# Patient Record
Sex: FEMALE | Race: WHITE | Employment: FULL TIME | URBAN - METROPOLITAN AREA
[De-identification: names, ages, dates, MRNs, and addresses within clinical notes are randomized per-mention and may not be internally consistent; named-entity substitution may affect disease eponyms.]

---

## 2019-08-07 ENCOUNTER — HOSPITAL ENCOUNTER (OUTPATIENT)
Dept: MAMMOGRAPHY | Age: 39
Discharge: HOME | End: 2019-08-07
Payer: OTHER GOVERNMENT

## 2019-08-07 DIAGNOSIS — Z12.31 VISIT FOR SCREENING MAMMOGRAM: ICD-10-CM

## 2019-08-07 PROCEDURE — 77063 BREAST TOMOSYNTHESIS BI: CPT

## 2019-08-09 ENCOUNTER — HOSPITAL ENCOUNTER (OUTPATIENT)
Dept: ULTRASOUND IMAGING | Age: 39
Discharge: HOME | End: 2019-08-09
Payer: OTHER GOVERNMENT

## 2019-08-09 DIAGNOSIS — R92.8 ABNORMAL MAMMOGRAM: ICD-10-CM

## 2019-08-09 PROCEDURE — 76642 ULTRASOUND BREAST LIMITED: CPT

## 2019-08-23 ENCOUNTER — HOSPITAL ENCOUNTER (OUTPATIENT)
Dept: MRI IMAGING | Age: 39
Discharge: HOME | End: 2019-08-23
Payer: OTHER GOVERNMENT

## 2019-08-23 DIAGNOSIS — R92.2 DENSE BREAST: ICD-10-CM

## 2019-08-23 PROCEDURE — 77049 MRI BREAST C-+ W/CAD BI: CPT

## 2019-08-23 PROCEDURE — 6360000004 HC RX CONTRAST MEDICATION: Performed by: FAMILY MEDICINE

## 2019-08-23 PROCEDURE — A9579 GAD-BASE MR CONTRAST NOS,1ML: HCPCS | Performed by: FAMILY MEDICINE

## 2019-08-23 RX ADMIN — GADOTERIDOL 18 ML: 279.3 INJECTION, SOLUTION INTRAVENOUS at 09:41

## 2024-08-28 ENCOUNTER — ANESTHESIA EVENT (OUTPATIENT)
Age: 44
End: 2024-08-28
Payer: OTHER GOVERNMENT

## 2024-08-28 ENCOUNTER — HOSPITAL ENCOUNTER (EMERGENCY)
Age: 44
Discharge: HOME | End: 2024-08-28
Attending: EMERGENCY MEDICINE
Payer: OTHER GOVERNMENT

## 2024-08-28 ENCOUNTER — ANESTHESIA (OUTPATIENT)
Age: 44
End: 2024-08-28
Payer: OTHER GOVERNMENT

## 2024-08-28 VITALS
OXYGEN SATURATION: 100 % | HEIGHT: 69 IN | TEMPERATURE: 97.9 F | BODY MASS INDEX: 27.4 KG/M2 | HEART RATE: 71 BPM | DIASTOLIC BLOOD PRESSURE: 75 MMHG | SYSTOLIC BLOOD PRESSURE: 101 MMHG | WEIGHT: 185 LBS | RESPIRATION RATE: 18 BRPM

## 2024-08-28 DIAGNOSIS — T18.128A FOOD IMPACTION OF ESOPHAGUS, INITIAL ENCOUNTER: Primary | ICD-10-CM
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
## 2024-08-28 DIAGNOSIS — K56.699 FOOD BOLUS OBSTRUCTION OF INTESTINE (HCC): ICD-10-CM

## 2024-08-28 LAB
ALBUMIN SERPL-MCNC: 4.6 G/DL (ref 3.4–5)
ALBUMIN/GLOB SERPL: 1.5 {RATIO}
ALP SERPL-CCNC: 43 U/L (ref 40–129)
ALT SERPL-CCNC: 38 U/L (ref 10–40)
ANION GAP SERPL CALCULATED.3IONS-SCNC: 13 MMOL/L (ref 3–16)
AST SERPL-CCNC: 47 U/L (ref 15–37)
BASOPHILS # BLD: 0.03 K/UL (ref 0–0.2)
BASOPHILS NFR BLD: 0 %
BILIRUB SERPL-MCNC: 1.2 MG/DL (ref 0–1)
BUN SERPL-MCNC: 8 MG/DL (ref 7–20)
CALCIUM SERPL-MCNC: 9.3 MG/DL (ref 8.3–10.6)
CHLORIDE SERPL-SCNC: 101 MMOL/L (ref 99–110)
CO2 SERPL-SCNC: 22 MMOL/L (ref 21–32)
CREAT SERPL-MCNC: 0.8 MG/DL (ref 0.5–1)
EOSINOPHIL # BLD: 0.13 K/UL (ref 0–0.6)
EOSINOPHILS RELATIVE PERCENT: 2 %
ERYTHROCYTE [DISTWIDTH] IN BLOOD BY AUTOMATED COUNT: 12.3 % (ref 12.4–15.4)
GFR, ESTIMATED: >90 ML/MIN/1.73M2
GLUCOSE SERPL-MCNC: 88 MG/DL (ref 70–99)
HCG, PREGNANCY URINE (POC): NEGATIVE
HCT VFR BLD AUTO: 39.8 % (ref 36–48)
HGB BLD-MCNC: 14 G/DL (ref 12–16)
IMM GRANULOCYTES # BLD AUTO: 0.01 K/UL (ref 0–0.5)
IMM GRANULOCYTES NFR BLD: 0 %
LYMPHOCYTES NFR BLD: 1.89 K/UL (ref 1–5.1)
LYMPHOCYTES RELATIVE PERCENT: 26 %
MCH RBC QN AUTO: 35.2 PG (ref 26–34)
MCHC RBC AUTO-ENTMCNC: 35.2 G/DL (ref 31–36)
MCV RBC AUTO: 100 FL (ref 80–100)
MONOCYTES NFR BLD: 0.88 K/UL (ref 0–1.3)
MONOCYTES NFR BLD: 12 %
NEUTROPHILS NFR BLD: 59 %
NEUTS SEG NFR BLD: 4.27 K/UL (ref 1.7–7.7)
PLATELET # BLD AUTO: 277 K/UL (ref 135–450)
PMV BLD AUTO: 9.4 FL
POTASSIUM SERPL-SCNC: ABNORMAL MMOL/L (ref 3.5–5.1)
PROT SERPL-MCNC: 7.6 G/DL (ref 6.4–8.2)
RBC # BLD AUTO: 3.98 M/UL (ref 4–5.2)
SODIUM SERPL-SCNC: 137 MMOL/L (ref 136–145)
WBC OTHER # BLD: 7.2 K/UL (ref 4–11)

## 2024-08-28 PROCEDURE — 85025 COMPLETE CBC W/AUTO DIFF WBC: CPT

## 2024-08-28 PROCEDURE — 2709999900 HC NON-CHARGEABLE SUPPLY: Performed by: INTERNAL MEDICINE

## 2024-08-28 PROCEDURE — 96374 THER/PROPH/DIAG INJ IV PUSH: CPT

## 2024-08-28 PROCEDURE — 81025 URINE PREGNANCY TEST: CPT

## 2024-08-28 PROCEDURE — 3609012900 HC EGD FOREIGN BODY REMOVAL: Performed by: INTERNAL MEDICINE

## 2024-08-28 PROCEDURE — 80053 COMPREHEN METABOLIC PANEL: CPT

## 2024-08-28 PROCEDURE — 7100000010 HC PHASE II RECOVERY - FIRST 15 MIN: Performed by: INTERNAL MEDICINE

## 2024-08-28 PROCEDURE — 3700000001 HC ADD 15 MINUTES (ANESTHESIA): Performed by: INTERNAL MEDICINE

## 2024-08-28 PROCEDURE — 96375 TX/PRO/DX INJ NEW DRUG ADDON: CPT

## 2024-08-28 PROCEDURE — 7100000001 HC PACU RECOVERY - ADDTL 15 MIN: Performed by: INTERNAL MEDICINE

## 2024-08-28 PROCEDURE — 3700000000 HC ANESTHESIA ATTENDED CARE: Performed by: INTERNAL MEDICINE

## 2024-08-28 PROCEDURE — 88305 TISSUE EXAM BY PATHOLOGIST: CPT

## 2024-08-28 PROCEDURE — 6360000002 HC RX W HCPCS: Performed by: ANESTHESIOLOGY

## 2024-08-28 PROCEDURE — 6360000002 HC RX W HCPCS: Performed by: EMERGENCY MEDICINE

## 2024-08-28 PROCEDURE — 7100000011 HC PHASE II RECOVERY - ADDTL 15 MIN: Performed by: INTERNAL MEDICINE

## 2024-08-28 PROCEDURE — 99284 EMERGENCY DEPT VISIT MOD MDM: CPT

## 2024-08-28 PROCEDURE — 2500000003 HC RX 250 WO HCPCS: Performed by: ANESTHESIOLOGY

## 2024-08-28 PROCEDURE — 7100000000 HC PACU RECOVERY - FIRST 15 MIN: Performed by: INTERNAL MEDICINE

## 2024-08-28 PROCEDURE — 2580000003 HC RX 258: Performed by: EMERGENCY MEDICINE

## 2024-08-28 RX ORDER — MIDAZOLAM HYDROCHLORIDE 1 MG/ML
INJECTION INTRAMUSCULAR; INTRAVENOUS PRN
Status: DISCONTINUED | OUTPATIENT
Start: 2024-08-28 | End: 2024-08-28 | Stop reason: SDUPTHER

## 2024-08-28 RX ORDER — SUCCINYLCHOLINE/SOD CL,ISO/PF 200MG/10ML
SYRINGE (ML) INTRAVENOUS PRN
Status: DISCONTINUED | OUTPATIENT
Start: 2024-08-28 | End: 2024-08-28 | Stop reason: SDUPTHER

## 2024-08-28 RX ORDER — LIDOCAINE HYDROCHLORIDE 20 MG/ML
INJECTION, SOLUTION INTRAVENOUS PRN
Status: DISCONTINUED | OUTPATIENT
Start: 2024-08-28 | End: 2024-08-28 | Stop reason: SDUPTHER

## 2024-08-28 RX ORDER — PROPOFOL 10 MG/ML
INJECTION, EMULSION INTRAVENOUS PRN
Status: DISCONTINUED | OUTPATIENT
Start: 2024-08-28 | End: 2024-08-28 | Stop reason: SDUPTHER

## 2024-08-28 RX ORDER — MULTIVIT-MIN/IRON/FOLIC ACID/K 18-600-40
CAPSULE ORAL
COMMUNITY

## 2024-08-28 RX ORDER — LORAZEPAM 2 MG/ML
1 INJECTION INTRAMUSCULAR ONCE
Status: COMPLETED | OUTPATIENT
Start: 2024-08-28 | End: 2024-08-28

## 2024-08-28 RX ORDER — PANTOPRAZOLE SODIUM 40 MG/1
40 TABLET, DELAYED RELEASE ORAL
Qty: 90 TABLET | Refills: 2 | Status: SHIPPED | OUTPATIENT
Start: 2024-08-28

## 2024-08-28 RX ORDER — 0.9 % SODIUM CHLORIDE 0.9 %
1000 INTRAVENOUS SOLUTION INTRAVENOUS ONCE
Status: COMPLETED | OUTPATIENT
Start: 2024-08-28 | End: 2024-08-28

## 2024-08-28 RX ORDER — GLUCAGON 1 MG/ML
1 KIT INJECTION ONCE
Status: COMPLETED | OUTPATIENT
Start: 2024-08-28 | End: 2024-08-28

## 2024-08-28 RX ORDER — IBUPROFEN 400 MG/1
400 TABLET, FILM COATED ORAL EVERY 6 HOURS PRN
COMMUNITY

## 2024-08-28 RX ORDER — GABAPENTIN 600 MG/1
600 TABLET ORAL 2 TIMES DAILY
COMMUNITY

## 2024-08-28 RX ADMIN — SODIUM CHLORIDE 1000 ML: 9 INJECTION, SOLUTION INTRAVENOUS at 16:11

## 2024-08-28 RX ADMIN — GLUCAGON 1 MG: 1 INJECTION, POWDER, LYOPHILIZED, FOR SOLUTION INTRAMUSCULAR; INTRAVENOUS at 15:59

## 2024-08-28 RX ADMIN — LORAZEPAM 1 MG: 2 INJECTION INTRAMUSCULAR; INTRAVENOUS at 16:09

## 2024-08-28 RX ADMIN — PROPOFOL 200 MG: 10 INJECTION, EMULSION INTRAVENOUS at 18:18

## 2024-08-28 RX ADMIN — LIDOCAINE HYDROCHLORIDE 30 MG: 20 INJECTION, SOLUTION INTRAVENOUS at 18:14

## 2024-08-28 RX ADMIN — MIDAZOLAM HYDROCHLORIDE 2 MG: 1 INJECTION, SOLUTION INTRAMUSCULAR; INTRAVENOUS at 18:14

## 2024-08-28 RX ADMIN — Medication 120 MG: at 18:18

## 2024-08-28 ASSESSMENT — LIFESTYLE VARIABLES
HOW OFTEN DO YOU HAVE A DRINK CONTAINING ALCOHOL: MONTHLY OR LESS
HOW MANY STANDARD DRINKS CONTAINING ALCOHOL DO YOU HAVE ON A TYPICAL DAY: 5 OR 6

## 2024-08-28 ASSESSMENT — PAIN - FUNCTIONAL ASSESSMENT
PAIN_FUNCTIONAL_ASSESSMENT: NONE - DENIES PAIN
PAIN_FUNCTIONAL_ASSESSMENT: 0-10

## 2024-08-28 ASSESSMENT — PAIN SCALES - GENERAL
PAINLEVEL_OUTOF10: 0

## 2024-08-28 ASSESSMENT — ENCOUNTER SYMPTOMS: SHORTNESS OF BREATH: 0

## 2024-08-28 NOTE — ED NOTES
Pt was eating at restaurant next store and felt meat get stuck. Pt breathing WNL. NO abnormal breath sounds

## 2024-08-28 NOTE — PROGRESS NOTES
Pt received from OR to PACU # 3.     Post: Procedure(s):  ESOPHAGOGASTRODUODENOSCOPY FOREIGN BODY REMOVAL     Report received from CRNA and OR RN.    Pt is arousable to voice . Pt is not fully wakeful from anesthesia.      Attached to PACU monitoring system. Alarms and parameters set    Denies pain and no complaints of nausea at this time.

## 2024-08-28 NOTE — PROGRESS NOTES
Patient drove self to hospital.  Before procedure started, advised her that she would not be able to drive herself home due to post anesthesia.  Patient contacted her friend Leonarda Eron. She stated to patient that she would pick her up later this evening when she gets off of work.  Patient will go to PACU after EGD and then PACU with discharge patient back to ER waiting area.  I called and advised the ER. They stated they would notify Security that she will be awaiting her ride in the ER waiting room.

## 2024-08-28 NOTE — DISCHARGE INSTRUCTIONS
ENDOSCOPY DISCHARGE INSTRUCTIONS:    Call the physician that did your procedure for any questions or concern:    [facility]: [phone]  DR. WAQAS BADILLO     ACTIVITY:    There are potential side effects to the medications used for sedation and anesthesia during your procedure.  These include:  Dizziness or light-headedness, confusion or memory loss, delayed reaction times, loss of coordination, nausea and vomiting.  Because of your increased risk for injury, we ask that you observe the following precautions:  For the next 24 hours,  DO NOT operate an automobile, bicycle, motorcycle, , power tools or large equipment of any kind.  Do not drink alcohol, sign any legal documents or make any legal decisions for 24 hours.  Do not bend your head over lower than your heart.  DO sit on the side of bed/couch awhile before getting up.  Plan on bedrest or quiet relaxation today.  You may resume normal activities in 24 hours.    DIET:    Your first meal today should be light, avoiding spicy and fatty foods.  If you tolerate this first meal, then you may advance to your regular diet unless otherwise advised by your physician.    NORMAL SYMPTOMS:  -Mild sore throat if you’ve had an EGD   -Gaseous discomfort    NOTIFY YOUR PHYSICIAN IF THESE SYMPTOMS OCCUR:  1. Fever (greater than 100)  5. Increased abdominal bloating  2. Severe pain    6. Excessive bleeding  3. Nausea and vomiting  7. Chest pain                                                                    4. Chills    8. Shortness of breath    ADDITIONAL INSTRUCTIONS:    Biopsy results: Call [facility] for biopsy results in 1 week    Educational Information:    Recommendation: Resume regular diet. Await pathology results. Continue present medications. The patient will be observed post-procedure, until all discharge criteria are met. Patient has a contact number available for emergencies. The signs and symptoms of potential delayed complications were

## 2024-08-28 NOTE — ED PROVIDER NOTES
Narrative    Not on file     Social Determinants of Health     Financial Resource Strain: Not on file   Food Insecurity: Not on file   Transportation Needs: Not on file   Physical Activity: Not on file   Stress: Not on file   Social Connections: Not on file   Intimate Partner Violence: Not on file   Housing Stability: Not on file       CURRENT MEDICATIONS  No current facility-administered medications for this encounter.     Current Outpatient Medications   Medication Sig Dispense Refill    gabapentin (NEURONTIN) 600 MG tablet Take 1 tablet by mouth 2 times daily.      ibuprofen (ADVIL;MOTRIN) 400 MG tablet Take 1 tablet by mouth every 6 hours as needed for Pain      Cholecalciferol (VITAMIN D) 50 MCG (2000 UT) CAPS capsule Take by mouth         ALLERGIES  No Known Allergies    PHYSICAL EXAM  VITAL SIGNS:    ED Triage Vitals [08/28/24 1451]   Encounter Vitals Group      BP (!) 126/96      Systolic BP Percentile       Diastolic BP Percentile       Pulse 90      Respirations 18      Temp 98.3 °F (36.8 °C)      Temp src       SpO2 95 %      Weight - Scale 83.9 kg (185 lb)      Height 1.753 m (5' 9\")      Head Circumference       Peak Flow       Pain Score       Pain Loc       Pain Education       Exclude from Growth Chart       Physical Exam  Vitals and nursing note reviewed.   Constitutional:       General: She is not in acute distress.     Appearance: She is not ill-appearing or toxic-appearing.   HENT:      Head: Normocephalic and atraumatic.      Mouth/Throat:      Mouth: Mucous membranes are moist.   Cardiovascular:      Rate and Rhythm: Normal rate and regular rhythm.   Pulmonary:      Effort: Pulmonary effort is normal. No respiratory distress.   Musculoskeletal:         General: Normal range of motion.   Skin:     General: Skin is warm and dry.      Findings: No rash.   Neurological:      General: No focal deficit present.      Mental Status: She is alert and oriented to person, place, and time.   Psychiatric:          Mood and Affect: Mood normal.         Behavior: Behavior normal.        BRIEF DIFFERENTIAL DIAGNOSIS  1.  Impacted food bolus  2.  Globus sensation  3.  Esophageal injury    INDEPENDENT EKG INTERPRETATION:  None    LABS  I have personally reviewed all labs for this visit.   Results for orders placed or performed during the hospital encounter of 08/28/24   CBC with Auto Differential   Result Value Ref Range    WBC 7.2 4.0 - 11.0 k/uL    RBC 3.98 (L) 4.00 - 5.20 m/uL    Hemoglobin 14.0 12.0 - 16.0 g/dL    Hematocrit 39.8 36.0 - 48.0 %    .0 80.0 - 100.0 fL    MCH 35.2 (H) 26.0 - 34.0 pg    MCHC 35.2 31.0 - 36.0 g/dL    RDW 12.3 (L) 12.4 - 15.4 %    Platelets 277 135 - 450 k/uL    MPV 9.4 fL    Neutrophils % 59 %    Lymphocytes % 26 %    Monocytes % 12 %    Eosinophils % 2 %    Basophils % 0 %    Immature Granulocytes % 0 0 %    Neutrophils Absolute 4.27 1.70 - 7.70 k/uL    Lymphocytes Absolute 1.89 1.00 - 5.10 k/uL    Monocytes Absolute 0.88 0.00 - 1.30 k/uL    Eosinophils Absolute 0.13 0.00 - 0.60 k/uL    Basophils Absolute 0.03 0.00 - 0.20 k/uL    Immature Granulocytes Absolute 0.01 0.00 - 0.50 k/uL   CMP w/ Reflex to MG   Result Value Ref Range    Sodium 137 136 - 145 mmol/L    Potassium Unable to perform testing: Specimen hemolyzed. 3.5 - 5.1 mmol/L    Chloride 101 99 - 110 mmol/L    CO2 22 21 - 32 mmol/L    Anion Gap 13 3 - 16 mmol/L    Glucose 88 70 - 99 mg/dL    BUN 8 7 - 20 mg/dL    Creatinine 0.8 0.5 - 1.0 mg/dL    Est, Glom Filt Rate >90 >60 mL/min/1.73m2    Calcium 9.3 8.3 - 10.6 mg/dL    Total Protein 7.6 6.4 - 8.2 g/dL    Albumin 4.6 3.4 - 5.0 g/dL    Albumin/Globulin Ratio 1.5     Total Bilirubin 1.2 (H) 0.0 - 1.0 mg/dL    Alkaline Phosphatase 43 40 - 129 U/L    ALT 38 10 - 40 U/L    AST 47 (H) 15 - 37 U/L       RADIOLOGY  Any applicable radiology studies including x-ray, CT, MRI, and/or ultrasound, were reviewed independently by me in addition to the radiologist.  I reviewed all radiology

## 2024-08-28 NOTE — ANESTHESIA POSTPROCEDURE EVALUATION
Department of Anesthesiology  Postprocedure Note    Patient: Lisa Chacko  MRN: [id number]  YOB: 1980  Date of evaluation: 8/28/2024    Procedure Summary       Date: 08/28/24 Room / Location: [place] [address] / [facility]    Anesthesia Start: 1809 Anesthesia Stop: 1839    Procedure: ESOPHAGOGASTRODUODENOSCOPY FOREIGN BODY REMOVAL Diagnosis:       Food bolus obstruction of intestine (HCC)      (Food bolus obstruction of intestine (HCC) [K56.699, W44.F3XA])    Surgeons: Chris Nayak MD Responsible Provider: Messi Obregon MD    Anesthesia Type: general ASA Status: 2 - Emergent            Anesthesia Type: No value filed.    Alexei Phase I: Alexei Score: 10    Alexei Phase II:      Anesthesia Post Evaluation    Patient location during evaluation: PACU  Level of consciousness: awake and alert  Airway patency: patent  Nausea & Vomiting: no nausea and no vomiting  Cardiovascular status: blood pressure returned to baseline  Respiratory status: acceptable  Hydration status: euvolemic  Comments: Postoperative Anesthesia Note    Name:    Lisa Chacko  MRN:      [id number]    Patient Vitals in the past 12 hrs:  08/28/24 1840, BP:119/83, Pulse:93, Resp:16, SpO2:100 %  08/28/24 1837, BP:126/87, Temp:98 °F (36.7 °C), Temp src:Infrared, Pulse:91, Resp:15, SpO2:100 %  08/28/24 1814, BP:138/85, Pulse:82, Resp:14, SpO2:100 %  08/28/24 1730, BP:121/80, Pulse:90, Resp:18, SpO2:97 %  08/28/24 1700, BP:96/67, Pulse:88, Resp:16, SpO2:95 %  08/28/24 1630, BP:116/80, Pulse:87, Resp:16, SpO2:100 %  08/28/24 1600, SpO2:100 %  08/28/24 1530, BP:123/89, Pulse:89, Resp:16, SpO2:100 %  08/28/24 1500, BP:107/85, Pulse:88, Resp:16, SpO2:96 %  08/28/24 1451, BP:(!) 126/96, Temp:98.3 °F (36.8 °C), Pulse:90, Resp:18, SpO2:95 %, Height:1.753 m (5' 9\"), Weight:83.9 kg (185 lb)     LABS:    CBC  Lab Results       Component                Value               Date/Time                  WBC

## 2024-08-28 NOTE — H&P
Gastroenterology Note             Pre-operative History and Physical    Patient: Lisa Chacko  : 1980  CSN:     History Obtained From:  patient and/or guardian.     HISTORY OF PRESENT ILLNESS:    The patient is a 43 y.o. female  here for EGD  This a very pleasant 43-year-old female who was eating some lunch today she was eating short ribs and then all of a sudden they became lodged in her soft  She tried to drink a a lot of water and Coke and it came up she was at a restaurant that was right across the street from [facility] so she came over here and I was called approximately 430 this afternoon about her      Past Medical History:    Past Medical History:   Diagnosis Date    Anxiety     Fibromyalgia     Migraine     TMJ (dislocation of temporomandibular joint)      Past Surgical History:    Past Surgical History:   Procedure Laterality Date    BREAST ENHANCEMENT SURGERY      FALLOPIAN TUBE SURGERY       Medications Prior to Admission:   No current facility-administered medications on file prior to encounter.     Current Outpatient Medications on File Prior to Encounter   Medication Sig Dispense Refill    gabapentin (NEURONTIN) 600 MG tablet Take 1 tablet by mouth 2 times daily.      ibuprofen (ADVIL;MOTRIN) 400 MG tablet Take 1 tablet by mouth every 6 hours as needed for Pain      Cholecalciferol (VITAMIN D) 50 MCG (2000 UT) CAPS capsule Take by mouth          Allergies:  Patient has no known allergies.      Social History:   Social History     Tobacco Use    Smoking status: Never     Passive exposure: Never    Smokeless tobacco: Never   Substance Use Topics    Alcohol use: Yes     Family History:   History reviewed. No pertinent family history.    PHYSICAL EXAM:      /85   Pulse 82   Temp 98.3 °F (36.8 °C)   Resp 14   Ht 1.753 m (5' 9\")   Wt 83.9 kg (185 lb)   LMP 2024   SpO2 100%   BMI 27.32 kg/m²  I        Heart:   RRR, normal s1s2    Lungs:  CTA bilat,   Normal effort    Abdomen:   NT, ND      ASA Grade:  ASA 2 - Patient with mild systemic disease with no functional limitations    Mallampati Class: 2          ASSESSMENT AND PLAN:    1.  Patient is a 43 y.o. female here for EGD with MAC.   2.  Procedure options, risks and benefits reviewed with patient.  Patient expresses understanding.    Chris Nayak MD,   [facility]  8/28/2024

## 2024-08-28 NOTE — ANESTHESIA PRE PROCEDURE
121/80   Pulse:  87 88 90   Resp:  16 16 18   Temp:       SpO2: 100% 100% 95% 97%   Weight:       Height:                                                  BP Readings from Last 3 Encounters:   08/28/24 121/80       NPO Status:                                                                                 BMI:   Wt Readings from Last 3 Encounters:   08/28/24 83.9 kg (185 lb)   08/23/19 87.1 kg (192 lb)     Body mass index is 27.32 kg/m².    CBC:   Lab Results   Component Value Date/Time    WBC 7.2 08/28/2024 03:55 PM    RBC 3.98 08/28/2024 03:55 PM    HGB 14.0 08/28/2024 03:55 PM    HCT 39.8 08/28/2024 03:55 PM    .0 08/28/2024 03:55 PM    RDW 12.3 08/28/2024 03:55 PM     08/28/2024 03:55 PM       CMP:   Lab Results   Component Value Date/Time     08/28/2024 03:55 PM    K Unable to perform testing: Specimen hemolyzed. 08/28/2024 03:55 PM     08/28/2024 03:55 PM    CO2 22 08/28/2024 03:55 PM    BUN 8 08/28/2024 03:55 PM    CREATININE 0.8 08/28/2024 03:55 PM    LABGLOM >90 08/28/2024 03:55 PM    GLUCOSE 88 08/28/2024 03:55 PM    CALCIUM 9.3 08/28/2024 03:55 PM    BILITOT 1.2 08/28/2024 03:55 PM    ALKPHOS 43 08/28/2024 03:55 PM    AST 47 08/28/2024 03:55 PM    ALT 38 08/28/2024 03:55 PM       POC Tests: No results for input(s): \"POCGLU\", \"POCNA\", \"POCK\", \"POCCL\", \"POCBUN\", \"POCHEMO\", \"POCHCT\" in the last 72 hours.    Coags: No results found for: \"PROTIME\", \"INR\", \"APTT\"    HCG (If Applicable): No results found for: \"PREGTESTUR\", \"PREGSERUM\", \"HCG\", \"HCGQUANT\"     ABGs: No results found for: \"PHART\", \"PO2ART\", \"RJO9WVW\", \"VOQ7GEO\", \"BEART\", \"W1ZMMRXT\"     Type & Screen (If Applicable):  No results found for: \"LABABO\"    Drug/Infectious Status (If Applicable):  No results found for: \"HIV\", \"HEPCAB\"    COVID-19 Screening (If Applicable): No results found for: \"COVID19\"        Anesthesia Evaluation  Patient summary reviewed and Nursing notes reviewed  Airway: Mallampati: II  TM distance: >3 FB    Neck ROM: full  Mouth opening: > = 3 FB   Dental:          Pulmonary:       (-) COPD, asthma and shortness of breath                           Cardiovascular:        (-) hypertension, valvular problems/murmurs, past MI, CAD, CABG/stent, dysrhythmias,  angina and no hyperlipidemia                Neuro/Psych:   (+) neuromuscular disease:, headaches: migraine headachesdepression/anxiety    (-) seizures, TIA and CVA           GI/Hepatic/Renal:        (-) GERD, PUD, liver disease and no renal disease       Endo/Other:        (-) diabetes mellitus               Abdominal:             Vascular: negative vascular ROS.         Other Findings:       Anesthesia Plan      general     ASA 2 - emergent     (I discussed with the patient the risks and benefits of PIV, general anesthesia, IV Narcotics, PACU.  All questions were answered the patient agrees with the plan.)  Induction: intravenous.      Anesthetic plan and risks discussed with patient.      Plan discussed with CRNA.                Messi Obregon MD   8/28/2024

## 2024-08-29 NOTE — NURSE NAVIGATOR
Pt resting quietly in bed. Pt stated that her friend is getting off work here in 5 min and will be around to pick her up soon. Pt dressed and ready. Denies needs at the present.

## 2024-08-29 NOTE — PROGRESS NOTES
Pt resting quietly in bed texting with her phone. She is waiting on her friend to get off work to come and get her. She said her friend gets off work at 9pm although might try to come sooner if she can. Pt drinking water. Pt denies needs at present.

## 2024-08-29 NOTE — PROGRESS NOTES
Ambulatory Surgery/Procedure Discharge Note    Vitals:    08/28/24 1930   BP: 101/75   Pulse: 71   Resp: 18   Temp:    SpO2: 100%     Temp: 97.9    No intake/output data recorded.    Pain assessment:  none  Pain Level: 0    Patient discharged to home/self care. Patient discharged via wheel chair by RN to waiting family/S.O.       8/28/2024 9:24 PM

## 2024-08-30 LAB — SURGICAL PATHOLOGY REPORT: NORMAL

## (undated) DEVICE — TUBING, SUCTION, 1/4" X 12', STRAIGHT: Brand: MEDLINE

## (undated) DEVICE — ENDOSCOPIC KIT 1.1+ 6 FT 2 GWN AAMI LEVEL 3

## (undated) DEVICE — BRUSH CLN L220CM DIA5MM ZAH DISP FOR WRK CHAN DIA2.8/4.2MM

## (undated) DEVICE — AIR/WATER CLEANING ADAPTER FOR OLYMPUS® GI ENDOSCOPE: Brand: BULLDOG®

## (undated) DEVICE — CONMED SCOPE SAVER BITE BLOCK, 20X27 MM: Brand: SCOPE SAVER

## (undated) DEVICE — RETRIEVAL DEVICE: Brand: RESCUENET™

## (undated) DEVICE — SINGLE USE AIR/WATER, SUCTION AND BIOPSY VALVES SET: Brand: ORCAPOD™

## (undated) DEVICE — SOLUTION IRRIG 1000ML STRL H2O USP PLAS POUR BTL